# Patient Record
(demographics unavailable — no encounter records)

---

## 2025-06-06 NOTE — HISTORY OF PRESENT ILLNESS
[de-identified] : The patient is a 69 year  old right hand dominant female who presents today complaining of right knee pain.  Date of Injury/Onset: 2012, worsening since 4/2025 Pain:    At Rest: 0/10  With Activity:  8-9/10  Mechanism of injury: Gradual onset of pain  This is NOT a Work Related Injury being treated under Worker's Compensation. This is NOT an athletic injury occurring associated with an interscholastic or organized sports team. Quality of symptoms: posterior knee pain, medial knee pain, bow leg deformity Improves with: gel injections, meloxicam, Tylenol  Worse with: downstairs, walking   Prior treatment: gel injections in the past with relief  Prior Imaging: none Out of work/sport: currently working  School/Sport/Position/Occupation: customer service  Additional Information: None

## 2025-06-06 NOTE — IMAGING
[de-identified] : The patient is a well appearing 69 year old female of their stated age. Patient ambulates with a normal gait. Negative straight leg raise bilateral   Effected Knee:  RIGHT ROM:  0-145 degrees Lachman: Negative Pivot Shift: Negative Anterior Drawer: Negative Posterior Drawer / Sag: Negative Varus Stress 0 degrees: Stable Varus Stress 30 degrees: Stable Valgus Stress 0 degrees: Stable Valgus Stress 30 degrees: Stable Medial Roberto: Negative Lateral Roberto: Negative Patella Glide: 2+ Patella Apprehension: Negative Patella Grind: Negative   Palpation: Medial Joint Line: Nontender Lateral Joint Line: Nontender Medial Collateral Ligament: Nontender Lateral Collateral Ligament/PLC: Nontender Distal Femur: Nontender Proximal Tibia: Nontender Tibial Tubercle: Nontender Distal Pole Patella: Nontender Quadriceps Tendon: Nontender &  Intact Patella Tendon: Nontender &  Intact Medial Femoral Condyle: Nontender Medial Distal Hamstring/PES: Nontender Lateral Distal Hamstring: Nontender & Stable Iliotibial Band: Nontender Medial Patellofemoral Ligament: Nontender Adductor: Nontender Proximal GSC-Plantaris: Nontender Calf: Supple & Nontender   Inspection: Deformity: No Erythema: No Ecchymosis: No Abrasions: No Effusion: No Prepatella Bursitis: No Neurologic Exam: Sensation L4-S1: Grossly Intact Motor Exam: Quadriceps: 5 out of 5 Hamstrings: 5 out of 5 EHL: 5 out of 5 FHL: 5 out of 5 TA: 5 out of 5 GS: 5 out of 5 Circulatory/Pulses: Dorsalis Pedis: 2+ Posterior Tibialis: 2+ Additional Pertinent Findings: None     Contralateral Knee:                       ROM: 0-145 degrees Other Pertinent Findings: None   Assessment: The patient is a 69 year old female with RIght knee and radiographic and physical exam findings consistent with medial OA The patients condition is acute Documents/Results Reviewed Today: X-Ray right knee Tests/Studies Independently Interpreted Today: X-Ray right knee reveals evidence of medial OA Pertinent findings include: 0-130, MJLT, patella crepitus  Confounding medical conditions/concerns: None     Plan:  Discussed treatment options for the patient's knee arthritis, both operative vs non-operative. Patient will start physical therapy and HEP to focus on strengthening. Advised patient to obtain Reparel sleeve. The patient was prescribed Meloxicam 15mg daily for two weeks, then PRN for pain management and inflammation - use as directed and take with food. The patient is aware and understands that if she fails all conservative treatment modalities, she should consider a total knee arthroplasty. In the interim, we will focus on conservative management of arthritic related pain. Discussed treatment options in the form of injections to aid in pain, inflammation, and discomfort. Patient elected to receive Right knee 9/1 CSI. Advised patient to rest and ice the area as tolerated. Modify activity as discussed Tests Ordered: None  Prescription Medications Ordered: Meloxicam 15 mg  Braces/DME Ordered: Reparel  Activity/Work/Sports Status: avoid hills and inclines Additional Instructions: Voltaren gel  Follow-Up: 4 weeks    Procedure Note: Musculoskeletal Injection Diagnosis: Right knee OA Procedure: Right knee, superolateral, 9/1 CSI   Indication:  The patient has had persistent pain despite conservative treatment.  Risks, benefits and alternatives to procedure were discussed; all questions were answered to the patient's apparent satisfaction and informed consent obtained.  The patient denied prior problems with local anesthetics, injectable cortisones, chicken allergy, coagulopathy and no relevant drug or preservative allergies or sensitivities.   The area of injection was prepared in a sterile fashion.  Prior to injection a 'Time Out' was conducted in accordance with [facility] policy and the site and nature of procedure verified with the patient.   Procedure: The procedure was carried out utilizing sterile technique from a superolateral arthroscopic portal position using a sterile 21-guage needle. Alcohol & betadine utilized for skin preparation    0cc of clear synovial fluid was aspirated utilizing a sterile 18-gauge needle. The specimen: (X) appeared benign and was discarded ( ) was sent for Culture / Cell Count / Crystal analysis / [_].]    Injection into the target area with care taken to aspirate frequently to minimize the risk of intravascular injection was performed with: ( ) 1cc of Depomedrol (80mg/ml) (X) 1cc of Dexamethasone (10mg/ml) ( ) 1cc of Toradol (30mg/ml) (X) 9cc of 0.5% Bupivacaine ( ) 1cc of 1% Lidocaine ( ) 5cc of 32mg Zilretta, prepared and diluted per  instructions ( ) 2 cc of Hylan G-F 20 (Synvisc) 16mg/2ml ( ) 6 cc of Hylan G-F 20 (SynvisoOne) 16mg/2ml ( ) 2cc of Euflexxa ( ) 2cc of Orthovisc ( ) 2cc of GelOne ( ) 3cc of Durolane (20mg/ml)   Patient tolerated the procedure well and direct pressure was applied for hemostasis. The patient was reminded of potential post-injection risks including, but not limited to, delayed hypersensitivity reactions and/or infection.  The patient verified that they had the office and the Emergency Room's contact information if any problems should arise.  After several minutes, the patient informed me that they felt fine and was released from the office.   Letter of Medical Necessity for ROAM OA Medial  Brace:   The patient is prescribed a ROAM OA Medial  Brace knee brace today as a mechanical non-operative intervention to reduce pain, improve physical function, and offload pressure on the damaged joint. This brace is indicated to mediate pain relief in conjunction with knee osteoarthritis and malalignment. The patient's medial compartment arthritis, resultant varus deformity, and secondary varus & valgus instability necessitate a medial  knee brace. This brace is medically necessary to offload the affected medial compartment, mitigating pain and improving joint stability. By correcting the varus alignment and providing support, the brace aims to improve the patient's overall knee function and reduce the risk of further injury.    The patient's current medication management of their orthopedic diagnosis was reviewed today:  The patient was prescribed Meloxicam 15mg QD for two weeks and then as needed. (1) We discussed a comprehensive treatment plan that included possible pharmaceutical management involving the use of prescription strength medications versus over the counter oral medications and topical prescription vs over the counter medications.  Based on our extensive discussion, the patient was prescribed Meloxicam 15mg daily for two weeks.  It will then be used PRN for pain, inflammation and discomfort. (2) There is a moderate risk of morbidity with further treatment, especially from use of prescription strength medications and possible side effects of these medications which include upset stomach with oral medications, skin reactions to topical medications and cardiac/renal issues with long term use. (3) I recommended that the patient follow-up with their medical physician to discuss any significant specific potential issues with long term medication use such as interactions with current medications or with exacerbation of underlying medical comorbidities. (4) The benefits and risks associated with use of injectable, oral or topical, prescription and over the counter anti-inflammatory medications were discussed with the patient. The patient voiced understanding of the risks including but not limited to bleeding, stroke, kidney dysfunction, heart disease, and were referred to the black box warning label for further information.  IAnju attest that this documentation has been prepared under the direction and in the presence of Provider Dr. Jacek Christiansen.  The documentation recorded by the scribe accurately reflects the services [email or web address], personally performed and the decisions made by me.

## 2025-06-06 NOTE — IMAGING
[de-identified] : The patient is a well appearing 69 year old female of their stated age. Patient ambulates with a normal gait. Negative straight leg raise bilateral   Effected Knee:  RIGHT ROM:  0-145 degrees Lachman: Negative Pivot Shift: Negative Anterior Drawer: Negative Posterior Drawer / Sag: Negative Varus Stress 0 degrees: Stable Varus Stress 30 degrees: Stable Valgus Stress 0 degrees: Stable Valgus Stress 30 degrees: Stable Medial Roberto: Negative Lateral Roberto: Negative Patella Glide: 2+ Patella Apprehension: Negative Patella Grind: Negative   Palpation: Medial Joint Line: Nontender Lateral Joint Line: Nontender Medial Collateral Ligament: Nontender Lateral Collateral Ligament/PLC: Nontender Distal Femur: Nontender Proximal Tibia: Nontender Tibial Tubercle: Nontender Distal Pole Patella: Nontender Quadriceps Tendon: Nontender &  Intact Patella Tendon: Nontender &  Intact Medial Femoral Condyle: Nontender Medial Distal Hamstring/PES: Nontender Lateral Distal Hamstring: Nontender & Stable Iliotibial Band: Nontender Medial Patellofemoral Ligament: Nontender Adductor: Nontender Proximal GSC-Plantaris: Nontender Calf: Supple & Nontender   Inspection: Deformity: No Erythema: No Ecchymosis: No Abrasions: No Effusion: No Prepatella Bursitis: No Neurologic Exam: Sensation L4-S1: Grossly Intact Motor Exam: Quadriceps: 5 out of 5 Hamstrings: 5 out of 5 EHL: 5 out of 5 FHL: 5 out of 5 TA: 5 out of 5 GS: 5 out of 5 Circulatory/Pulses: Dorsalis Pedis: 2+ Posterior Tibialis: 2+ Additional Pertinent Findings: None     Contralateral Knee:                       ROM: 0-145 degrees Other Pertinent Findings: None   Assessment: The patient is a 69 year old female with RIght knee and radiographic and physical exam findings consistent with medial OA The patients condition is acute Documents/Results Reviewed Today: X-Ray right knee Tests/Studies Independently Interpreted Today: X-Ray right knee reveals evidence of medial OA Pertinent findings include: 0-130, MJLT, patella crepitus  Confounding medical conditions/concerns: None     Plan:  Discussed treatment options for the patient's knee arthritis, both operative vs non-operative. Patient will start physical therapy and HEP to focus on strengthening. Advised patient to obtain Reparel sleeve. The patient was prescribed Meloxicam 15mg daily for two weeks, then PRN for pain management and inflammation - use as directed and take with food. The patient is aware and understands that if she fails all conservative treatment modalities, she should consider a total knee arthroplasty. In the interim, we will focus on conservative management of arthritic related pain. Discussed treatment options in the form of injections to aid in pain, inflammation, and discomfort. Patient elected to receive Right knee 9/1 CSI. Advised patient to rest and ice the area as tolerated. Modify activity as discussed Tests Ordered: None  Prescription Medications Ordered: Meloxicam 15 mg  Braces/DME Ordered: Reparel  Activity/Work/Sports Status: avoid hills and inclines Additional Instructions: Voltaren gel  Follow-Up: 4 weeks    Procedure Note: Musculoskeletal Injection Diagnosis: Right knee OA Procedure: Right knee, superolateral, 9/1 CSI   Indication:  The patient has had persistent pain despite conservative treatment.  Risks, benefits and alternatives to procedure were discussed; all questions were answered to the patient's apparent satisfaction and informed consent obtained.  The patient denied prior problems with local anesthetics, injectable cortisones, chicken allergy, coagulopathy and no relevant drug or preservative allergies or sensitivities.   The area of injection was prepared in a sterile fashion.  Prior to injection a 'Time Out' was conducted in accordance with [facility] policy and the site and nature of procedure verified with the patient.   Procedure: The procedure was carried out utilizing sterile technique from a superolateral arthroscopic portal position using a sterile 21-guage needle. Alcohol & betadine utilized for skin preparation    0cc of clear synovial fluid was aspirated utilizing a sterile 18-gauge needle. The specimen: (X) appeared benign and was discarded ( ) was sent for Culture / Cell Count / Crystal analysis / [_].]    Injection into the target area with care taken to aspirate frequently to minimize the risk of intravascular injection was performed with: ( ) 1cc of Depomedrol (80mg/ml) (X) 1cc of Dexamethasone (10mg/ml) ( ) 1cc of Toradol (30mg/ml) (X) 9cc of 0.5% Bupivacaine ( ) 1cc of 1% Lidocaine ( ) 5cc of 32mg Zilretta, prepared and diluted per  instructions ( ) 2 cc of Hylan G-F 20 (Synvisc) 16mg/2ml ( ) 6 cc of Hylan G-F 20 (SynvisoOne) 16mg/2ml ( ) 2cc of Euflexxa ( ) 2cc of Orthovisc ( ) 2cc of GelOne ( ) 3cc of Durolane (20mg/ml)   Patient tolerated the procedure well and direct pressure was applied for hemostasis. The patient was reminded of potential post-injection risks including, but not limited to, delayed hypersensitivity reactions and/or infection.  The patient verified that they had the office and the Emergency Room's contact information if any problems should arise.  After several minutes, the patient informed me that they felt fine and was released from the office.   Letter of Medical Necessity for ROAM OA Medial  Brace:   The patient is prescribed a ROAM OA Medial  Brace knee brace today as a mechanical non-operative intervention to reduce pain, improve physical function, and offload pressure on the damaged joint. This brace is indicated to mediate pain relief in conjunction with knee osteoarthritis and malalignment. The patient's medial compartment arthritis, resultant varus deformity, and secondary varus & valgus instability necessitate a medial  knee brace. This brace is medically necessary to offload the affected medial compartment, mitigating pain and improving joint stability. By correcting the varus alignment and providing support, the brace aims to improve the patient's overall knee function and reduce the risk of further injury.    The patient's current medication management of their orthopedic diagnosis was reviewed today:  The patient was prescribed Meloxicam 15mg QD for two weeks and then as needed. (1) We discussed a comprehensive treatment plan that included possible pharmaceutical management involving the use of prescription strength medications versus over the counter oral medications and topical prescription vs over the counter medications.  Based on our extensive discussion, the patient was prescribed Meloxicam 15mg daily for two weeks.  It will then be used PRN for pain, inflammation and discomfort. (2) There is a moderate risk of morbidity with further treatment, especially from use of prescription strength medications and possible side effects of these medications which include upset stomach with oral medications, skin reactions to topical medications and cardiac/renal issues with long term use. (3) I recommended that the patient follow-up with their medical physician to discuss any significant specific potential issues with long term medication use such as interactions with current medications or with exacerbation of underlying medical comorbidities. (4) The benefits and risks associated with use of injectable, oral or topical, prescription and over the counter anti-inflammatory medications were discussed with the patient. The patient voiced understanding of the risks including but not limited to bleeding, stroke, kidney dysfunction, heart disease, and were referred to the black box warning label for further information.  IAnju attest that this documentation has been prepared under the direction and in the presence of Provider Dr. Jacek Christiansen.  The documentation recorded by the scribe accurately reflects the services [email or web address]. [email or web address], personally performed and the decisions made by me.

## 2025-06-06 NOTE — HISTORY OF PRESENT ILLNESS
[de-identified] : The patient is a 69 year  old right hand dominant female who presents today complaining of right knee pain.  Date of Injury/Onset: 2012, worsening since 4/2025 Pain:    At Rest: 0/10  With Activity:  8-9/10  Mechanism of injury: Gradual onset of pain  This is NOT a Work Related Injury being treated under Worker's Compensation. This is NOT an athletic injury occurring associated with an interscholastic or organized sports team. Quality of symptoms: posterior knee pain, medial knee pain, bow leg deformity Improves with: gel injections, meloxicam, Tylenol  Worse with: downstairs, walking   Prior treatment: gel injections in the past with relief  Prior Imaging: none Out of work/sport: currently working  School/Sport/Position/Occupation: customer service  Additional Information: None